# Patient Record
Sex: FEMALE | Race: WHITE
[De-identification: names, ages, dates, MRNs, and addresses within clinical notes are randomized per-mention and may not be internally consistent; named-entity substitution may affect disease eponyms.]

---

## 2021-07-11 ENCOUNTER — HOSPITAL ENCOUNTER (INPATIENT)
Dept: HOSPITAL 79 - ER1 | Age: 41
LOS: 2 days | Discharge: HOME | DRG: 308 | End: 2021-07-13
Attending: INTERNAL MEDICINE | Admitting: STUDENT IN AN ORGANIZED HEALTH CARE EDUCATION/TRAINING PROGRAM
Payer: COMMERCIAL

## 2021-07-11 VITALS — BODY MASS INDEX: 27.99 KG/M2 | WEIGHT: 168 LBS | HEIGHT: 65 IN

## 2021-07-11 DIAGNOSIS — I51.9: ICD-10-CM

## 2021-07-11 DIAGNOSIS — R94.31: ICD-10-CM

## 2021-07-11 DIAGNOSIS — Z98.51: ICD-10-CM

## 2021-07-11 DIAGNOSIS — Z79.01: ICD-10-CM

## 2021-07-11 DIAGNOSIS — Z83.6: ICD-10-CM

## 2021-07-11 DIAGNOSIS — Z79.82: ICD-10-CM

## 2021-07-11 DIAGNOSIS — D72.819: ICD-10-CM

## 2021-07-11 DIAGNOSIS — F17.210: ICD-10-CM

## 2021-07-11 DIAGNOSIS — R00.2: ICD-10-CM

## 2021-07-11 DIAGNOSIS — U07.1: ICD-10-CM

## 2021-07-11 DIAGNOSIS — I48.0: ICD-10-CM

## 2021-07-11 DIAGNOSIS — D69.6: ICD-10-CM

## 2021-07-11 DIAGNOSIS — I48.92: Primary | ICD-10-CM

## 2021-07-11 DIAGNOSIS — R51.9: ICD-10-CM

## 2021-07-11 DIAGNOSIS — F19.10: ICD-10-CM

## 2021-07-11 LAB
BUN/CREATININE RATIO: 10 (ref 0–10)
HGB BLD-MCNC: 17.5 GM/DL (ref 12.3–15.3)
RED BLOOD COUNT: 5.73 M/UL (ref 4–5.1)
WHITE BLOOD COUNT: 4.2 K/UL (ref 4.5–11)

## 2021-07-11 PROCEDURE — 8E0ZXY6 ISOLATION: ICD-10-PCS | Performed by: STUDENT IN AN ORGANIZED HEALTH CARE EDUCATION/TRAINING PROGRAM

## 2021-07-11 PROCEDURE — U0002 COVID-19 LAB TEST NON-CDC: HCPCS

## 2021-07-12 LAB
BUN/CREATININE RATIO: 16 (ref 0–10)
HGB BLD-MCNC: 17.5 GM/DL (ref 12.3–15.3)
RED BLOOD COUNT: 5.95 M/UL (ref 4–5.1)
WHITE BLOOD COUNT: 6.1 K/UL (ref 4.5–11)

## 2021-07-13 LAB
BUN/CREATININE RATIO: 25 (ref 0–10)
HGB BLD-MCNC: 17.3 GM/DL (ref 12.3–15.3)
RED BLOOD COUNT: 5.72 M/UL (ref 4–5.1)
WHITE BLOOD COUNT: 10.9 K/UL (ref 4.5–11)

## 2021-07-30 ENCOUNTER — HOSPITAL ENCOUNTER (EMERGENCY)
Dept: HOSPITAL 79 - ER1 | Age: 41
Discharge: HOME | End: 2021-07-30
Payer: COMMERCIAL

## 2021-07-30 DIAGNOSIS — I48.92: ICD-10-CM

## 2021-07-30 DIAGNOSIS — Z79.01: ICD-10-CM

## 2021-07-30 DIAGNOSIS — I48.0: Primary | ICD-10-CM

## 2021-07-30 LAB
BUN/CREATININE RATIO: 16 (ref 0–10)
HGB BLD-MCNC: 15 GM/DL (ref 12.3–15.3)
RED BLOOD COUNT: 4.86 M/UL (ref 4–5.1)
WHITE BLOOD COUNT: 7.5 K/UL (ref 4.5–11)

## 2022-07-05 ENCOUNTER — HOSPITAL ENCOUNTER (OUTPATIENT)
Dept: HOSPITAL 79 - HEART 5 | Age: 42
End: 2022-07-05
Attending: PHYSICIAN ASSISTANT
Payer: COMMERCIAL

## 2022-07-05 DIAGNOSIS — I20.9: Primary | ICD-10-CM

## 2022-07-05 PROCEDURE — A9502 TC99M TETROFOSMIN: HCPCS

## 2023-03-03 NOTE — PROGRESS NOTES
Electrophysiology Clinic Consult     Vanessa Reyes  1980  [unfilled]  [unfilled]    03/08/23    DATE OF ADMISSION: (Not on file)  [facility]ARD[facility]OLOG[facility]    Elan Castro PA-C [address]  [address] [facility] / [facility] [address]  Referring Provider: Kevin Obrien MD     Chief Complaint   Patient presents with   • Atrial Fibrillation     Problem list:  1. PAF, Atrial flutter  a. Chadvasc: 1, was on Eliquis but stopped due to anemia and heavy menstrual bleeding  b. EPS with RFA of atrial flutter 10/6/20, atrial fibrillation during procedure that required cardioversion  c. Started on Sotalol but had hypotension and fatigue, dizziness, SOB so it was discontinue  d. Echo 7/11/2021: LVEF 70%, severe concentric left ventricular hypertrophy, severe atrial enlargement, moderate TR, moderate pulmonary hypertension  e. Exercise stress test 7/5/2022: No chest pain, angina, no ischemia on EKG, no arrhythmia, normal hemodynamic response atrial flutter atrial flutter  2. Previous drug use : on suboxone  3. Surgeries  a. Tubal   b. RFA of atrial flutter     History of Present Illness:   Vanessa Reyes is a 42 year old female with above past medical history referred from [facility], MARISABEL Nagel for management of atrial fibrillation/atrial flutter. She was diagnosed with atrial flutter about 3 years ago, symptoms of heart racing, SOB, fatigue. Episodes seems to be correlated with her menstrual cycle. She underwent RFA of atrial flutter 10/2020 but has had breakthrough episodes. Started on Sotalol but it did not help episodes and she became SOB, BLE edema, fatigued and dizzy all the time. She was having chest pain while on Sotalol but no longer having chest pain now off medication. She was on Eliquis but was stopped in November by PCP due to anemia and heavy menstrual bleeding.   Caffeine: 1 cup of coffee  Alcohol: wine 1 glass about 3 times per week  Stimulant:  none  Nicotine: Vape     TSH 2.33   T4 1.33    No Known Allergies     Cannot display prior to admission medications because the patient has not been admitted in this contact.            Current Outpatient Medications:   •  buprenorphine-naloxone (SUBOXONE) 8-2 MG per SL tablet, Place 1 tablet under the tongue Daily., Disp: , Rfl:   •  Cholecalciferol 50 MCG (2000 UT) tablet, Take 1 tablet by mouth Daily., Disp: , Rfl:   •  nitroglycerin (NITROSTAT) 0.4 MG SL tablet, Take 1 tablet by mouth As Needed., Disp: , Rfl:     Social History     Socioeconomic History   • Marital status: Single   Tobacco Use   • Smoking status: Former     Types: Cigarettes     Quit date:      Years since quittin.1   • Smokeless tobacco: Never   Vaping Use   • Vaping Use: Some days   • Substances: Nicotine   Substance and Sexual Activity   • Alcohol use: Yes     Comment: occ.   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   HEENT:  No visual loss, blurred vision, double vision, yellow sclerae.                   No hearing loss, congestion, sore throat.   SKIN:      No rashes, urticaria, ulcers, sores.     RESP:     +shortness of breath, -hemoptysis, cough, sputum.   GI:           No anorexia, nausea, vomiting, diarrhea. No abdominal pain, melena.   :         No burning on urination, hematuria or increased frequency.  ENDO:    No diaphoresis, cold or heat intolerance. No polyuria or polydipsia.   NEURO:  No headache, +dizziness, -syncope, paralysis, ataxia, or parasthesias.                  No change in bowel or bladder control. No history of CVA/TIA  MUSC:    No muscle, back pain, joint pain or stiffness.   HEME:    No  "anemia, bleeding, bruising. No history of DVT/PE.  PSYCH:  No history of depression, anxiety    Vitals:    03/08/23 0859   BP: 100/60   BP Location: Right arm   Patient Position: Sitting   Pulse: 60   SpO2: 98%   Weight: 80.3 kg (177 lb)   Height: 165.1 cm (65\")                 Physical Exam:  GEN: Well nourished, well-developed, no acute distress  HEENT: Normocephalic, atraumatic, PERRLA, moist mucous membranes  NECK: Supple, NO JVD, no thyromegaly, no lymphadenopathy  CARD: S1S2, RRR, S4 present.  PMI not displaced.  No murmurs appreciated.  LUNGS: Clear to auscultation, normal respiratory effort  ABDOMEN: Soft, nontender, normal bowel sounds  EXTREMITIES: No gross deformities, no clubbing, cyanosis, or edema  SKIN: Warm, dry, no obvious lesions.  NEURO: No focal deficits, alert and oriented x 3  PSYCHIATRIC: Normal affect and mood      I personally viewed and interpreted the patient's EKG/Telemetry/lab data    No results found for: GLUCOSE, CALCIUM, NA, K, CO2, CL, BUN, CREATININE, EGFRIFAFRI, EGFRIFNONA, BCR, ANIONGAP  No results found for: WBC, HGB, HCT, MCV, PLT  No results found for: INR, PROTIME  No results found for: TSH, O3CAQDW, O4DQBSP, THYROIDAB      Procedures      ICD-10-CM ICD-9-CM   1. AF (paroxysmal atrial fibrillation) ([facility])  I48.0 427.31   2. Atrial flutter, paroxysmal ([facility])  I48.92 427.32   3. LVH (left ventricular hypertrophy)  I51.7 429.3       Assessment and Plan:   1. PAF, Atrial flutter  -Dx :3 years ago with symptoms of heart racing, SOB, fatigue. Episodes seem to be correlated with menstrual cycle and have lasted up to 2 days.  -failed sotalol   -Chadvasc: 1, was on Eliquis but stopped due to anemia and heavy menstrual bleeding  -s/p EPS with RFA of atrial flutter 10/6/2020, atrial fibrillation during procedure that required cardioversion  -Echo 7/11/2021: LVEF 70%, severe concentric left ventricular hypertrophy, severe atrial enlargement, moderate TR, moderate pulmonary " hypertension  -Exercise stress test 7/5/2022: No chest pain, angina, no ischemia on EKG, no arrhythmia, normal hemodynamic response atrial flutter atrial flutter  -Discussed options of EPS +/- PVA/left atrial flutter ablation. The risks, benefits, and alternatives of the procedure have been reviewed and the patient wishes to proceed.  She will need to be placed on Eliquis 4 weeks prior and 2 months after the ablation.      2. Severe concentric left ventricular hypertrophy   -on last echo 7/11/21  -will repeat echo to evaluate the cause of LVH and to see if it still present.  Also we need to evaluate the possibility of hypertrophic cardiomyopathy. Depending on results of echo, may need cardiac MRI     Scribed for Arsh Davis MD by JEY Hawk. 3/8/2023  10:17 SANA ABDULLAHI, Arsh Davis MD, personally performed the services described in this documentation as scribed by the above named individual in my presence, and it is both accurate and complete.  3/8/2023  10:17 EST

## 2023-03-08 ENCOUNTER — HOSPITAL ENCOUNTER (OUTPATIENT)
Dept: CARDIOLOGY | Facility: HOSPITAL | Age: 43
Discharge: HOME | End: 2023-03-08
Admitting: INTERNAL MEDICINE
Payer: COMMERCIAL

## 2023-03-08 ENCOUNTER — OFFICE VISIT (OUTPATIENT)
Dept: CARDIOLOGY | Facility: CLINIC | Age: 43
End: 2023-03-08
Payer: COMMERCIAL

## 2023-03-08 VITALS
HEIGHT: 65 IN | SYSTOLIC BLOOD PRESSURE: 116 MMHG | BODY MASS INDEX: 29.49 KG/M2 | DIASTOLIC BLOOD PRESSURE: 62 MMHG | WEIGHT: 177.03 LBS

## 2023-03-08 VITALS
DIASTOLIC BLOOD PRESSURE: 60 MMHG | WEIGHT: 177 LBS | HEART RATE: 60 BPM | SYSTOLIC BLOOD PRESSURE: 100 MMHG | BODY MASS INDEX: 29.49 KG/M2 | OXYGEN SATURATION: 98 % | HEIGHT: 65 IN

## 2023-03-08 DIAGNOSIS — I48.92 ATRIAL FLUTTER, PAROXYSMAL: ICD-10-CM

## 2023-03-08 DIAGNOSIS — I48.0 AF (PAROXYSMAL ATRIAL FIBRILLATION): ICD-10-CM

## 2023-03-08 DIAGNOSIS — I48.0 AF (PAROXYSMAL ATRIAL FIBRILLATION): Primary | ICD-10-CM

## 2023-03-08 DIAGNOSIS — I51.7 LVH (LEFT VENTRICULAR HYPERTROPHY): ICD-10-CM

## 2023-03-08 DIAGNOSIS — I48.92 ATRIAL FLUTTER, PAROXYSMAL: Primary | ICD-10-CM

## 2023-03-08 PROCEDURE — 99244 OFF/OP CNSLTJ NEW/EST MOD 40: CPT | Performed by: INTERNAL MEDICINE

## 2023-03-08 PROCEDURE — 93306 TTE W/DOPPLER COMPLETE: CPT | Performed by: INTERNAL MEDICINE

## 2023-03-08 PROCEDURE — 93306 TTE W/DOPPLER COMPLETE: CPT

## 2023-03-08 RX ORDER — BUPRENORPHINE HYDROCHLORIDE AND NALOXONE HYDROCHLORIDE DIHYDRATE 8; 2 MG/1; MG/1
1 TABLET SUBLINGUAL DAILY
COMMUNITY
Start: 2022-12-20

## 2023-03-08 RX ORDER — NITROGLYCERIN 0.4 MG/1
0.4 TABLET SUBLINGUAL AS NEEDED
COMMUNITY
Start: 2022-10-22

## 2023-03-10 LAB
ASCENDING AORTA: 2.9 CM
BH CV ECHO MEAS - AO MAX PG: 9.6 MMHG
BH CV ECHO MEAS - AO MEAN PG: 5 MMHG
BH CV ECHO MEAS - AO ROOT DIAM: 2.8 CM
BH CV ECHO MEAS - AO V2 MAX: 155 CM/SEC
BH CV ECHO MEAS - AO V2 VTI: 42.5 CM
BH CV ECHO MEAS - AVA(I,D): 2.37 CM2
BH CV ECHO MEAS - EDV(CUBED): 89.4 ML
BH CV ECHO MEAS - EDV(MOD-SP2): 73.6 ML
BH CV ECHO MEAS - EDV(MOD-SP4): 81.5 ML
BH CV ECHO MEAS - EF(MOD-BP): 69.8 %
BH CV ECHO MEAS - EF(MOD-SP2): 67.9 %
BH CV ECHO MEAS - EF(MOD-SP4): 72.6 %
BH CV ECHO MEAS - ESV(CUBED): 16.4 ML
BH CV ECHO MEAS - ESV(MOD-SP2): 23.6 ML
BH CV ECHO MEAS - ESV(MOD-SP4): 22.3 ML
BH CV ECHO MEAS - FS: 43.2 %
BH CV ECHO MEAS - IVS/LVPW: 1.31 CM
BH CV ECHO MEAS - IVSD: 1.27 CM
BH CV ECHO MEAS - LA DIMENSION: 4.7 CM
BH CV ECHO MEAS - LAT PEAK E' VEL: 7.5 CM/SEC
BH CV ECHO MEAS - LV MASS(C)D: 178.5 GRAMS
BH CV ECHO MEAS - LV MAX PG: 7.7 MMHG
BH CV ECHO MEAS - LV MEAN PG: 4 MMHG
BH CV ECHO MEAS - LV V1 MAX: 138.5 CM/SEC
BH CV ECHO MEAS - LV V1 VTI: 35.9 CM
BH CV ECHO MEAS - LVIDD: 4.5 CM
BH CV ECHO MEAS - LVIDS: 2.5 CM
BH CV ECHO MEAS - LVOT AREA: 2.8 CM2
BH CV ECHO MEAS - LVOT DIAM: 1.89 CM
BH CV ECHO MEAS - LVPWD: 0.97 CM
BH CV ECHO MEAS - MED PEAK E' VEL: 6.2 CM/SEC
BH CV ECHO MEAS - MV A MAX VEL: 46.1 CM/SEC
BH CV ECHO MEAS - MV DEC SLOPE: 339.2 CM/SEC2
BH CV ECHO MEAS - MV DEC TIME: 0.3 MSEC
BH CV ECHO MEAS - MV E MAX VEL: 101.9 CM/SEC
BH CV ECHO MEAS - MV E/A: 2.21
BH CV ECHO MEAS - MV P1/2T: 101.1 MSEC
BH CV ECHO MEAS - MVA(P1/2T): 2.18 CM2
BH CV ECHO MEAS - PA ACC TIME: 0.16 SEC
BH CV ECHO MEAS - PA PR(ACCEL): 7.7 MMHG
BH CV ECHO MEAS - RAP SYSTOLE: 3 MMHG
BH CV ECHO MEAS - RVSP: 56 MMHG
BH CV ECHO MEAS - SV(LVOT): 100.4 ML
BH CV ECHO MEAS - SV(MOD-SP2): 50 ML
BH CV ECHO MEAS - SV(MOD-SP4): 59.2 ML
BH CV ECHO MEAS - TAPSE (>1.6): 2.9 CM
BH CV ECHO MEAS - TR MAX PG: 53 MMHG
BH CV ECHO MEAS - TR MAX VEL: 299.6 CM/SEC
BH CV ECHO MEASUREMENTS AVERAGE E/E' RATIO: 14.88
BH CV XLRA - RV BASE: 3.6 CM
BH CV XLRA - RV LENGTH: 6.2 CM
BH CV XLRA - RV MID: 1.87 CM
BH CV XLRA - TDI S': 9.2 CM/SEC
LEFT ATRIUM VOLUME INDEX: 53.2 ML/M2
MAXIMAL PREDICTED HEART RATE: 176 BPM
STRESS TARGET HR: 150 BPM

## 2023-03-20 ENCOUNTER — DOCUMENTATION (OUTPATIENT)
Dept: CARDIOLOGY | Facility: CLINIC | Age: 43
End: 2023-03-20
Payer: COMMERCIAL

## 2023-03-20 ENCOUNTER — TELEPHONE (OUTPATIENT)
Dept: CARDIOLOGY | Facility: CLINIC | Age: 43
End: 2023-03-20
Payer: COMMERCIAL

## 2023-03-20 DIAGNOSIS — I48.0 AF (PAROXYSMAL ATRIAL FIBRILLATION): ICD-10-CM

## 2023-03-20 DIAGNOSIS — I48.92 ATRIAL FLUTTER, PAROXYSMAL: Primary | ICD-10-CM

## 2023-03-20 DIAGNOSIS — I51.7 LEFT VENTRICULAR HYPERTROPHY: ICD-10-CM

## 2023-03-20 NOTE — TELEPHONE ENCOUNTER
----- Message from Arsh Davis MD sent at 3/20/2023 12:27 PM EDT -----  Please call pt. Tell her that the echo was inconclusive. I want to get a cardiac MRI w/s den. Thanks

## 2023-04-25 ENCOUNTER — OUTSIDE FACILITY SERVICE (OUTPATIENT)
Dept: CARDIOLOGY | Facility: CLINIC | Age: 43
End: 2023-04-25
Payer: COMMERCIAL

## 2023-04-25 ENCOUNTER — HOSPITAL ENCOUNTER (OUTPATIENT)
Dept: MRI IMAGING | Facility: HOSPITAL | Age: 43
Discharge: HOME | End: 2023-04-25
Payer: COMMERCIAL

## 2023-04-25 DIAGNOSIS — I48.92 ATRIAL FLUTTER, PAROXYSMAL: ICD-10-CM

## 2023-04-25 DIAGNOSIS — I51.7 LEFT VENTRICULAR HYPERTROPHY: ICD-10-CM

## 2023-04-25 DIAGNOSIS — I48.0 AF (PAROXYSMAL ATRIAL FIBRILLATION): ICD-10-CM

## 2023-04-25 PROCEDURE — 75561 CARDIAC MRI FOR MORPH W/DYE: CPT

## 2023-04-25 PROCEDURE — 0 GADOBENATE DIMEGLUMINE 529 MG/ML SOLUTION: Performed by: INTERNAL MEDICINE

## 2023-04-25 PROCEDURE — A9577 INJ MULTIHANCE: HCPCS | Performed by: INTERNAL MEDICINE

## 2023-04-25 RX ADMIN — GADOBENATE DIMEGLUMINE 24 ML: 529 INJECTION, SOLUTION INTRAVENOUS at 18:50

## 2023-04-28 ENCOUNTER — TELEPHONE (OUTPATIENT)
Dept: CARDIOLOGY | Facility: CLINIC | Age: 43
End: 2023-04-28
Payer: COMMERCIAL

## 2023-04-28 NOTE — TELEPHONE ENCOUNTER
----- Message from Arsh Davis MD sent at 4/28/2023  1:43 PM EDT -----  Please call the patient back and tell her that the MRI was normal.  This is good news.      ----- Message -----  From: Sophie Barron RegSched Rep  Sent: 4/28/2023  11:06 AM EDT  To: Arsh Davis MD

## 2023-06-14 ENCOUNTER — OFFICE VISIT (OUTPATIENT)
Dept: CARDIOLOGY | Facility: CLINIC | Age: 43
End: 2023-06-14
Payer: COMMERCIAL

## 2023-06-14 VITALS
HEIGHT: 65 IN | OXYGEN SATURATION: 98 % | BODY MASS INDEX: 30.29 KG/M2 | HEART RATE: 73 BPM | SYSTOLIC BLOOD PRESSURE: 122 MMHG | WEIGHT: 181.8 LBS | DIASTOLIC BLOOD PRESSURE: 82 MMHG

## 2023-06-14 DIAGNOSIS — I48.0 AF (PAROXYSMAL ATRIAL FIBRILLATION): ICD-10-CM

## 2023-06-14 DIAGNOSIS — I48.92 ATRIAL FLUTTER, PAROXYSMAL: Primary | ICD-10-CM

## 2023-06-14 PROCEDURE — 99214 OFFICE O/P EST MOD 30 MIN: CPT | Performed by: PHYSICIAN ASSISTANT

## 2023-06-14 PROCEDURE — 93000 ELECTROCARDIOGRAM COMPLETE: CPT | Performed by: PHYSICIAN ASSISTANT

## 2023-06-14 NOTE — PROGRESS NOTES
Electrophysiology Clinic Consult     Vanessa Reyes  1980 06/14/23    DATE OF ADMISSION: (Not on file)  [facility]ARDIOLOGY MA[facility]    Elan Castro PA-C [address] [facility] [address]  Referring Provider: No ref. provider found     Chief Complaint   Patient presents with   • <9>AF (paroxysmal atrial fibrillation)     Problem list:  1. PAF, Atrial flutter  a. Chadvasc: 1, was on Eliquis but stopped due to anemia and heavy menstrual bleeding  b. EPS with RFA of atrial flutter 10/6/20, atrial fibrillation during procedure that required cardioversion  c. Started on Sotalol but had hypotension and fatigue, dizziness, SOB so it was discontinue  d. Echo 7/11/2021: LVEF 70%, severe concentric left ventricular hypertrophy, severe atrial enlargement, moderate TR, moderate pulmonary hypertension  e. Exercise stress test 7/5/2022: No chest pain, angina, no ischemia on EKG, no arrhythmia, normal hemodynamic response atrial flutter atrial flutter  f. Cardiac MRI 4/25/2023 no hypertrophic obstructive cardiomyopathy.  2. Previous drug use : on suboxone  3. Surgeries  a. Tubal   b. RFA of atrial flutter     History of Present Illness:   Vanessa Reyes is a 42 year old female with above past medical history referred from [facility], MARIASBEL Nagel for management of atrial fibrillation/atrial flutter. She was diagnosed with atrial flutter about 3 years ago, symptoms of heart racing, SOB, fatigue. Episodes seems to be correlated with her menstrual cycle. She underwent RFA of atrial flutter 10/2020 but has had breakthrough episodes. Started on Sotalol but it did not help episodes and she became SOB, BLE edema, fatigued and dizzy all the time. She was having chest pain while on Sotalol but no longer having chest pain now off medication.      She was last seen by our office March 8, 2023.  During this time a repeat echocardiogram was ordered to review LV  function, for LVH and rule out obstructive hypertrophic cardiomyopathy.  She also has a cardiac MRI that was stable.  She reports he continues to have days where she feels fatigued and weak and has some palpitations.  She has been off Eliquis since last seen by our office.  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  She denies any current heart failure, Angina or edema symptoms.  TSH 2.33   T4 1.33    No Known Allergies     Cannot display prior to admission medications because the patient has not been admitted in this contact.              Current Outpatient Medications:   •  apixaban (ELIQUIS) 5 MG tablet tablet, Take 1 tablet by mouth 2 (Two) Times a Day., Disp: 60 tablet, Rfl: 6  •  buprenorphine-naloxone (SUBOXONE) 8-2 MG per SL tablet, Place 1 tablet under the tongue Daily., Disp: , Rfl:   •  Cholecalciferol 50 MCG (2000 UT) tablet, Take 1 tablet by mouth Daily., Disp: , Rfl:   •  nitroglycerin (NITROSTAT) 0.4 MG SL tablet, Take 1 tablet by mouth As Needed., Disp: , Rfl:     Social History     Socioeconomic History   • Marital status:    Tobacco Use   • Smoking status: Former     Types: Electronic Cigarette   • Smokeless tobacco: Never   Vaping Use   • Vaping Use: Some days   • Substances: Nicotine   Substance and Sexual Activity   • Alcohol use: Yes     Alcohol/week: 1.0 standard drink     Types: 1 Glasses of wine per week     Comment: Maybe 1 glass a week   • Drug use: Yes     Types: Amphetamines, Hydrocodone     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   • Sexual activity: Yes     Partners: Male     Birth control/protection: Tubal ligation       Family History   Problem Relation Age of Onset   • Cardiomyopathy Father    • Atrial fibrillation Father    • Anemia Father    • Arrhythmia Father    • Asthma Father    • Heart attack Father    • Heart disease Father    •  "Heart failure Father    • Hypertension Maternal Grandmother    • Anemia Maternal Uncle         Heart attack   • Heart attack Maternal Uncle    • Heart disease Maternal Uncle    • Anemia Maternal Uncle         Heart failure   • Heart attack Maternal Uncle    • Heart disease Maternal Uncle    • Heart failure Maternal Uncle    • Asthma Sister    Daughter: Cardiomyopathy, pacemaker, stroke    REVIEW OF SYSTEMS:   CONST:  No weight loss, fever, chills, +weakness +fatigue.   HEENT:  No visual loss, blurred vision, double vision, yellow sclerae.                   No hearing loss, congestion, sore throat.   SKIN:      No rashes, urticaria, ulcers, sores.     RESP:     +shortness of breath, -hemoptysis, cough, sputum.   GI:           No anorexia, nausea, vomiting, diarrhea. No abdominal pain, melena.   :         No burning on urination, hematuria or increased frequency.  ENDO:    No diaphoresis, cold or heat intolerance. No polyuria or polydipsia.   NEURO:  No headache, +dizziness, -syncope, paralysis, ataxia, or parasthesias.                  No change in bowel or bladder control. No history of CVA/TIA  MUSC:    No muscle, back pain, joint pain or stiffness.   HEME:    No anemia, bleeding, bruising. No history of DVT/PE.  PSYCH:  No history of depression, anxiety    Vitals:    06/14/23 1302   BP: 122/82   BP Location: Left arm   Patient Position: Sitting   Pulse: 73   SpO2: 98%   Weight: 82.5 kg (181 lb 12.8 oz)   Height: 165.1 cm (65\")                 Physical Exam:  GEN: Well nourished, well-developed, no acute distress  HEENT: Normocephalic, atraumatic, PERRLA, moist mucous membranes  NECK: Supple, NO JVD, no thyromegaly, no lymphadenopathy  CARD: Irregular rate regular rhythm, RRR, S4 present.  PMI not displaced.  No murmurs appreciated.  LUNGS: Clear to auscultation, normal respiratory effort  ABDOMEN: Soft, nontender, normal bowel sounds  EXTREMITIES: No gross deformities, no clubbing, cyanosis, or edema  SKIN: " Warm, dry, no obvious lesions.  NEURO: No focal deficits, alert and oriented x 3  PSYCHIATRIC: Normal affect and mood      I personally viewed and interpreted the patient's EKG/Telemetry/lab data      ECG 12 Lead    Date/Time: 6/14/2023 1:41 PM  Performed by: Ryne Rodriguez PA  Authorized by: Ryne Rodriguez PA   Comparison: compared with previous ECG from 3/8/2023  Rhythm: atrial flutter  Rate: normal  Conduction: conduction normal    Clinical impression: abnormal EKG and non-specific ECG              ICD-10-CM ICD-9-CM   1. Atrial flutter, paroxysmal  I48.92 427.32   2. AF (paroxysmal atrial fibrillation)  I48.0 427.31       Assessment and Plan:   1. PAF, Atrial flutter  -Dx :3 years ago with symptoms of heart racing, SOB, fatigue. Episodes seem to be correlated with menstrual cycle and have lasted up to 2 days.  -failed sotalol   -Chadvasc: 1, was on Eliquis but stopped due to anemia and heavy menstrual bleeding  -s/p EPS with RFA of atrial flutter 10/6/2020, atrial fibrillation during procedure that required cardioversion  --Exercise stress test 7/5/2022: No chest pain, angina, no ischemia on EKG, no arrhythmia, normal hemodynamic response atrial flutter atrial flutter  -      2. Severe concentric left ventricular hypertrophy   -on last echo 7/11/21  -Repeat echo reveals normal LV function EF 70% mild septal asymmetry symmetric hypertrophy left atrial cavity moderate to severe dilated moderate right ventricular enlargement  Follow-up cardiac MRI no evidence of HOCM 4/2023    3. Vaping: Discussed the patient needs to stop vaping immediately.    4. [place]licit drug use: Clean for 12 years.  She has not used any drugs for over 12 years and is remained on Suboxone.    Again reviewed risk and benefits of pursuing EP study and ablation procedure with the patient.  She understands this would like to pursue.  She is okay with scheduling in the near future.  We did discuss with her she needs to immediately start  Eliquis 5 mg p.o. twice daily currently she has to be on this for 1 month prior to the procedure.  Electronically signed by MARISABEL Gunderson, 06/14/23, 1:43 PM EDT.

## 2023-10-17 ENCOUNTER — TELEPHONE (OUTPATIENT)
Dept: ADMINISTRATIVE | Facility: HOSPITAL | Age: 43
End: 2023-10-17
Payer: COMMERCIAL

## 2023-11-09 ENCOUNTER — PREP FOR SURGERY (OUTPATIENT)
Dept: OTHER | Facility: HOSPITAL | Age: 43
End: 2023-11-09

## 2023-11-09 DIAGNOSIS — I48.0 AF (PAROXYSMAL ATRIAL FIBRILLATION): ICD-10-CM

## 2023-11-09 DIAGNOSIS — I48.92 ATRIAL FLUTTER, PAROXYSMAL: Primary | ICD-10-CM

## 2023-11-09 RX ORDER — SODIUM CHLORIDE 9 MG/ML
1 INJECTION, SOLUTION INTRAVENOUS CONTINUOUS
OUTPATIENT
Start: 2023-11-09 | End: 2023-11-09

## 2023-11-09 RX ORDER — NITROGLYCERIN 0.4 MG/1
0.4 TABLET SUBLINGUAL
OUTPATIENT
Start: 2023-11-09

## 2023-11-09 RX ORDER — SODIUM CHLORIDE 9 MG/ML
40 INJECTION, SOLUTION INTRAVENOUS AS NEEDED
OUTPATIENT
Start: 2023-11-09

## 2023-11-09 RX ORDER — SODIUM CHLORIDE 0.9 % (FLUSH) 0.9 %
10 SYRINGE (ML) INJECTION AS NEEDED
OUTPATIENT
Start: 2023-11-09

## 2023-11-09 RX ORDER — ACETAMINOPHEN 325 MG/1
650 TABLET ORAL EVERY 4 HOURS PRN
OUTPATIENT
Start: 2023-11-09

## 2023-11-09 RX ORDER — SODIUM CHLORIDE 0.9 % (FLUSH) 0.9 %
3 SYRINGE (ML) INJECTION EVERY 12 HOURS SCHEDULED
OUTPATIENT
Start: 2023-11-09

## 2023-11-15 ENCOUNTER — TELEPHONE (OUTPATIENT)
Dept: CARDIOLOGY | Facility: CLINIC | Age: 43
End: 2023-11-15

## 2023-11-15 NOTE — TELEPHONE ENCOUNTER
Nando with [facility] [employer] called and states patient would like to cancel her upcoming procedure. He states the patient does not currently have healthcare coverage and she does not want to be responsible for the cost of the procedure.

## 2023-11-16 ENCOUNTER — TELEPHONE (OUTPATIENT)
Dept: CARDIOLOGY | Facility: CLINIC | Age: 43
End: 2023-11-16

## 2023-11-16 NOTE — TELEPHONE ENCOUNTER
Caller: Vanessa Reyes    Relationship: Self    Best call back number: [phone] (home)      What is the best time to reach you: ANYTIME ON MONDAYS    Who are you requesting to speak with (clinical staff, provider,  specific staff member): ANY    What was the call regarding: PATIENT CALLED TO ADVISE SHE IS SCHEDULED FOR AN ABLATION PROCEDURE ON 11-28-23, BUT SHE CURRENTLY DOES NOT HAVE ANY INSURANCE. SHE WOULD LIKE TO RESCHEDULE INTO 2024. PLEASE CONTACT PATIENT IN REGARDS TO THIS ISSUE.    Is it okay if the provider responds through MyChart: PHONE CALL PLEASE.

## 2024-03-12 ENCOUNTER — HOSPITAL ENCOUNTER (OUTPATIENT)
Dept: CARDIOLOGY | Facility: HOSPITAL | Age: 44
Discharge: HOME | End: 2024-03-12
Admitting: PHYSICIAN ASSISTANT
Payer: COMMERCIAL

## 2024-03-12 ENCOUNTER — OFFICE VISIT (OUTPATIENT)
Dept: CARDIOLOGY | Facility: CLINIC | Age: 44
End: 2024-03-12
Payer: COMMERCIAL

## 2024-03-12 VITALS
BODY MASS INDEX: 31.16 KG/M2 | DIASTOLIC BLOOD PRESSURE: 77 MMHG | SYSTOLIC BLOOD PRESSURE: 117 MMHG | WEIGHT: 187 LBS | HEIGHT: 65 IN

## 2024-03-12 VITALS
DIASTOLIC BLOOD PRESSURE: 78 MMHG | HEIGHT: 65 IN | SYSTOLIC BLOOD PRESSURE: 128 MMHG | OXYGEN SATURATION: 100 % | WEIGHT: 187 LBS | BODY MASS INDEX: 31.16 KG/M2 | HEART RATE: 79 BPM

## 2024-03-12 DIAGNOSIS — I48.92 ATRIAL FLUTTER WITH CONTROLLED RESPONSE: ICD-10-CM

## 2024-03-12 DIAGNOSIS — I48.92 ATRIAL FLUTTER WITH CONTROLLED RESPONSE: Primary | ICD-10-CM

## 2024-03-12 LAB
ASCENDING AORTA: 3 CM
BH CV ECHO MEAS - AO MAX PG: 9.1 MMHG
BH CV ECHO MEAS - AO MEAN PG: 5 MMHG
BH CV ECHO MEAS - AO ROOT DIAM: 3 CM
BH CV ECHO MEAS - AO V2 MAX: 151 CM/SEC
BH CV ECHO MEAS - AO V2 VTI: 30.5 CM
BH CV ECHO MEAS - AVA(I,D): 2.05 CM2
BH CV ECHO MEAS - EDV(CUBED): 117.6 ML
BH CV ECHO MEAS - EDV(MOD-SP2): 97.7 ML
BH CV ECHO MEAS - EDV(MOD-SP4): 98.5 ML
BH CV ECHO MEAS - EF(MOD-BP): 55.4 %
BH CV ECHO MEAS - EF(MOD-SP2): 57.1 %
BH CV ECHO MEAS - EF(MOD-SP4): 53.5 %
BH CV ECHO MEAS - ESV(CUBED): 31.1 ML
BH CV ECHO MEAS - ESV(MOD-SP2): 41.9 ML
BH CV ECHO MEAS - ESV(MOD-SP4): 45.8 ML
BH CV ECHO MEAS - FS: 35.8 %
BH CV ECHO MEAS - IVS/LVPW: 1 CM
BH CV ECHO MEAS - IVSD: 0.9 CM
BH CV ECHO MEAS - LA DIMENSION: 4.4 CM
BH CV ECHO MEAS - LAT PEAK E' VEL: 12 CM/SEC
BH CV ECHO MEAS - LV DIASTOLIC VOL/BSA (35-75): 51.5 CM2
BH CV ECHO MEAS - LV MASS(C)D: 153 GRAMS
BH CV ECHO MEAS - LV MAX PG: 5.3 MMHG
BH CV ECHO MEAS - LV MEAN PG: 3 MMHG
BH CV ECHO MEAS - LV SYSTOLIC VOL/BSA (12-30): 23.9 CM2
BH CV ECHO MEAS - LV V1 MAX: 115 CM/SEC
BH CV ECHO MEAS - LV V1 VTI: 24.3 CM
BH CV ECHO MEAS - LVIDD: 4.9 CM
BH CV ECHO MEAS - LVIDS: 3.1 CM
BH CV ECHO MEAS - LVOT AREA: 2.6 CM2
BH CV ECHO MEAS - LVOT DIAM: 1.81 CM
BH CV ECHO MEAS - LVPWD: 0.9 CM
BH CV ECHO MEAS - MED PEAK E' VEL: 9.9 CM/SEC
BH CV ECHO MEAS - MV A MAX VEL: 39.4 CM/SEC
BH CV ECHO MEAS - MV DEC SLOPE: 514.6 CM/SEC2
BH CV ECHO MEAS - MV DEC TIME: 0.17 SEC
BH CV ECHO MEAS - MV E MAX VEL: 109 CM/SEC
BH CV ECHO MEAS - MV E/A: 2.8
BH CV ECHO MEAS - MV MAX PG: 5.3 MMHG
BH CV ECHO MEAS - MV MEAN PG: 1.68 MMHG
BH CV ECHO MEAS - MV P1/2T: 62.9 MSEC
BH CV ECHO MEAS - MV V2 VTI: 28.9 CM
BH CV ECHO MEAS - MVA(P1/2T): 3.5 CM2
BH CV ECHO MEAS - MVA(VTI): 2.17 CM2
BH CV ECHO MEAS - PA ACC TIME: 0.12 SEC
BH CV ECHO MEAS - RAP SYSTOLE: 3 MMHG
BH CV ECHO MEAS - RVSP: 31 MMHG
BH CV ECHO MEAS - SI(MOD-SP2): 29.2 ML/M2
BH CV ECHO MEAS - SI(MOD-SP4): 27.5 ML/M2
BH CV ECHO MEAS - SV(LVOT): 62.6 ML
BH CV ECHO MEAS - SV(MOD-SP2): 55.8 ML
BH CV ECHO MEAS - SV(MOD-SP4): 52.7 ML
BH CV ECHO MEAS - TAPSE (>1.6): 2.8 CM
BH CV ECHO MEAS - TR MAX PG: 27.5 MMHG
BH CV ECHO MEAS - TR MAX VEL: 262.3 CM/SEC
BH CV ECHO MEASUREMENTS AVERAGE E/E' RATIO: 9.95
BH CV XLRA - RV BASE: 2.7 CM
BH CV XLRA - RV LENGTH: 6.3 CM
BH CV XLRA - RV MID: 1.76 CM
BH CV XLRA - TDI S': 8.4 CM/SEC
LEFT ATRIUM VOLUME INDEX: 45.4 ML/M2

## 2024-03-12 PROCEDURE — 93306 TTE W/DOPPLER COMPLETE: CPT | Performed by: INTERNAL MEDICINE

## 2024-03-12 PROCEDURE — 93306 TTE W/DOPPLER COMPLETE: CPT

## 2024-03-12 PROCEDURE — 99214 OFFICE O/P EST MOD 30 MIN: CPT | Performed by: PHYSICIAN ASSISTANT

## 2024-03-12 PROCEDURE — 93000 ELECTROCARDIOGRAM COMPLETE: CPT | Performed by: PHYSICIAN ASSISTANT

## 2024-03-12 RX ORDER — FERROUS SULFATE 324(65)MG
324 TABLET, DELAYED RELEASE (ENTERIC COATED) ORAL
COMMUNITY

## 2024-03-12 NOTE — PROGRESS NOTES
"[facility] at [facility]   OFFICE NOTE      Vanessa Reyes  1980  PCP: Elan Csatro PA-C    SUBJECTIVE:   Vanessa Reyes is a 43 y.o. female seen for a follow up visit regarding the following:     CC:Aflutter    HPI:   Pleasant 43-year-old female returns today from a over 6-month year  regarding follow-up regarding A-fib and flutter.  Patient previously had a flutter ablation October 2020 at [facility].  During the procedure she also had atrial fibrillation.  She was seen by Dr. Davis for recurrent symptoms March 8, 2023.  In addition she had an echo revealing severe LVH and she had a cardiac MRI in follow-up revealing there was no evidence of HOCM.  During her visit with Susan as discussed option of redo a flutter ablation if she has recurrent palpitations also concerning a PVA as she did have some A-fib during the initial procedure.  She did had a follow-up with our office in June 14, 2023 was found to be in recurrent flutter.  Again it was reiterated the need to pursue a redo procedure for flutter and a PVA.  She states she lost her insurance was able to complete the procedure.  She now presents after reports that she has had more abnormal uterine bleeding and requires a total abdominal hysterectomy.  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  She tells me she is not having chest pain.  She has not had any worsening shortness of breath does feel palpitations when she bends down to pick something up heavy or if she has to rush to do something quickly.  She has had no dizziness near syncope sink.  Today she presents today for \"clearance\" prior to her hysterectomy that she is going to do in [facility].    Cardiac PMH: (Old records have been reviewed and summarized below)  PAF, Atrial flutter  Chadvasc: 1, was on Eliquis but stopped due to anemia and " "heavy menstrual bleeding  EPS with RFA of atrial flutter 10/6/20, atrial fibrillation during procedure that required cardioversion  Started on Sotalol but had hypotension and fatigue, dizziness, SOB so it was discontinue  Echo 7/11/2021: LVEF 70%, severe concentric left ventricular hypertrophy, severe atrial enlargement, moderate TR, moderate pulmonary hypertension  Exercise stress test 7/5/2022: No chest pain, angina, no ischemia on EKG, no arrhythmia, normal hemodynamic response atrial flutter atrial flutter  Cardiac MRI 4/25/2023 no hypertrophic obstructive cardiomyopathy.  Previous drug use : on suboxone  Vaping, on going  Recent abnormal uterine bleeding, large fibroids requiring surgical revision [facility] Dr. Bassett      Past Medical History, Past Surgical History, Family history, Social History, and Medications were all reviewed with the patient today and updated as necessary.       Current Outpatient Medications:     buprenorphine-naloxone (SUBOXONE) 8-2 MG per SL tablet, Place 1 tablet under the tongue Daily., Disp: , Rfl:     ferrous sulfate 324 (65 Fe) MG tablet delayed-release EC tablet, Take 1 tablet by mouth Daily With Breakfast., Disp: , Rfl:     apixaban (ELIQUIS) 5 MG tablet tablet, Take 1 tablet by mouth 2 (Two) Times a Day., Disp: 60 tablet, Rfl: 6    Cholecalciferol 50 MCG (2000 UT) tablet, Take 1 tablet by mouth Daily., Disp: , Rfl:     nitroglycerin (NITROSTAT) 0.4 MG SL tablet, Take 1 tablet by mouth As Needed., Disp: , Rfl:       No Known Allergies      PHYSICAL EXAM:    /78 (BP Location: Right arm, Patient Position: Sitting)   Pulse 79   Ht 165.1 cm (65\")   Wt 84.8 kg (187 lb)   SpO2 100%   BMI 31.12 kg/m²        Wt Readings from Last 5 Encounters:   03/12/24 84.8 kg (187 lb)   06/14/23 82.5 kg (181 lb 12.8 oz)   03/08/23 80.3 kg (177 lb 0.5 oz)   03/08/23 80.3 kg (177 lb)       BP Readings from Last 5 Encounters:   03/12/24 128/78   06/14/23 122/82   03/08/23 116/62 "   23 100/60       General appearance - Alert, well appearing, and in no distress   Mental status - Affect appropriate to mood.  Eyes - Sclerae anicteric,  ENMT - Hearing grossly normal bilaterally, Dental hygiene good.  Neck - Carotids upstroke normal bilaterally, no bruits, no JVD.  Resp - Clear to auscultation, no wheezes, rales or rhonchi, symmetric air entry.  Heart -regular rate regular rhythm normal S1, S2, no murmurs, rubs, clicks or gallops.  GI - Soft, nontender, nondistended, no masses or organomegaly.  Neurological - Grossly intact - normal speech, no focal findings  Musculoskeletal - No joint tenderness, deformity or swelling, no muscular tenderness noted.  Extremities - Peripheral pulses normal, no pedal edema, no clubbing or cyanosis.  Skin - Normal coloration and turgor.  Psych -  oriented to person, place, and time.    Medical problems     EKG: (EKG has been independently visualized by me and summarized below)    ECG 12 Lead    Date/Time: 3/12/2024 1:52 PM  Performed by: Ryne Rodriguez PA    Authorized by: Ryne Rodriguez PA  Comparison: compared with previous ECG from 2023  Similar to previous ECG  Rhythm: atrial flutter  Rate: normal  Conduction: conduction normal  ST Segments: ST segments normal  QRS axis: normal  Other findings: T wave abnormality         .  Surgical Cardiac Risk Assessment    Patient Name: Vanessa Reyes : 1980 MRN: [id number]    Revised Cardiac Risk Index (RCRI)     Clinical Risk Factors  Check if Present or Past    History    High-risk type of surgery (examples include vascular surgery and any open intraperitoneal or intrathoracic procedure) []   +1 point     History of ischemic heart disease (history of myocardial infarction or a positive exercise test, current complaint of chest pain considered to be secondary to myocardial ischemia, use of nitrate therapy, or ECG with pathological Q waves; do not count prior coronary revascularization  procedures unless one of the other criteria for ischemic heart disease is present) []   +1 point     History of heart failure []   +1 point     History of cerebrovascular disease []   +1 point     Diabetes mellitus requiring treatment with insulin   []   +1 point   Preoperative serum creatinine >2.0 mg/dL   (177 micromol/L)   []   +1 point       Rate of cardiac death, nonfatal myocardial infarction, and nonfatal cardiac arrest according to the number of predictors   Total Points       [x] 0= 0.4% (95% CI 0.1-0.8)       [] 1= 1.0% (95% CI 0.5-1.4)       [] 2= 2.4% (95% CI 1.3-3.5)       [] 3 or more= 5.4% (95% CI 2.8-7.9)         Rate of myocardial infarction, pulmonary edema, ventricular fibrillation, primary cardiac arrest, and complete heart block   Total Points       [x] 0= 0.5% (95% CI 0.2-1.1)       [] 1= 1.3% (95% CI 0.7-2.1)       [] 2= 3.6% (95% CI 2.1-5.6)       [] 3 or more= 9.1% (95% CI 5.5-13.8)     Recommendations    [x] Careful hemodynamic control (I.e, HR 60-70 bpm, no hypotension)  [x] If patient has been on beta blocker, continue throughout pre-operative period, if stable BP.  [x] Continue pre-op statin if no contraindications  [x] Hold ACE/ARB/Entresto 24 hours before and after if stable BP.  [x] Avoid initiation of Clonidine pre-op.   [x] EKG post op recovery room if risk is > 5%.      ASSESSMENT   1. PAF, Atrial flutter  -Dx :3 years ago with symptoms of heart racing, SOB, fatigue. Episodes seem to be correlated with menstrual cycle and have lasted up to 2 days.  -failed sotalol   -Chadvasc: 1, was on Eliquis but stopped due to anemia and heavy menstrual bleeding  -s/p EPS with RFA of atrial flutter 10/6/2020, atrial fibrillation during procedure that required cardioversion  --Exercise stress test 7/5/2022: No chest pain, angina, no ischemia on EKG, no arrhythmia, normal hemodynamic response atrial flutter atrial flutter  -        2. Severe concentric left ventricular hypertrophy   -on last echo  7/11/21  -Repeat echo reveals normal LV function EF 70% mild septal asymmetry symmetric hypertrophy left atrial cavity moderate to severe dilated moderate right ventricular enlargement  Follow-up cardiac MRI no evidence of HOCM 4/2023     3. Vaping: Discussed the patient needs to stop vaping immediately.     4. Ilicit drug use: Clean for 12 years.  She has not used any drugs for over 12 years and is remained on Suboxone.      PLAN  Patient is currently in rate controlled atrial flutter.  She is unable to pursue redo flutter ablation and PVA procedure in the past year because lost insurance but now she is has insurance again.  She apparently is having severe abnormal uterine bleeding due to fibroid and requires a total hysterectomy.  She asked for clearance for the procedure today.  She will have an echocardiogram for LV function.  If EF is normal from a cardiovascular standpoint she is considered acceptable risk to proceed this procedure as she has no angina or heart failure symptoms and her revised cardiac risk index is less than 1%..  Would initiate Eliquis following the procedure when acceptable per surgeon.  Long-term she states she will consider PVA/redo right atrial flutter ablation will reorder this procedure and a short-term follow-up Dr. Davis.        3/12/2024  13:23 EDT  Electronically signed by MARISABEL Gunderson, 03/12/24, 1:31 PM EDT.

## 2024-03-13 ENCOUNTER — DOCUMENTATION (OUTPATIENT)
Dept: CARDIOLOGY | Facility: CLINIC | Age: 44
End: 2024-03-13
Payer: COMMERCIAL

## 2024-03-13 NOTE — PROGRESS NOTES
03/13/24      Re: Vanessa Reyes, 1980   Procedure/Surgery - [facility]        To whom it may concern    Vanessa Reyes, 1980 is acceptable RISK  for scheduled procedure/surgery from a cardiac/EP standpoint.  She recently has a normal echocardiogram and has no anginal or heart failure symptoms.  She is in a persistent atrial flutter with a [id number] score of 1.  Would recommend she initiate Eliquis 5 mg twice daily postoperatively when stable.  She has a less than 2% risk of stroke.  Any questions please call [phone].              Sincerely,          MARISABEL Gunderson PA

## 2024-07-17 DIAGNOSIS — I48.0 PAROXYSMAL ATRIAL FIBRILLATION: Primary | ICD-10-CM
